# Patient Record
Sex: FEMALE | Race: WHITE | Employment: FULL TIME | URBAN - METROPOLITAN AREA
[De-identification: names, ages, dates, MRNs, and addresses within clinical notes are randomized per-mention and may not be internally consistent; named-entity substitution may affect disease eponyms.]

---

## 2019-05-23 ENCOUNTER — HOSPITAL ENCOUNTER (OUTPATIENT)
Dept: MAMMOGRAPHY | Age: 64
Discharge: HOME | End: 2019-05-23
Payer: COMMERCIAL

## 2019-05-23 DIAGNOSIS — Z12.31 VISIT FOR SCREENING MAMMOGRAM: ICD-10-CM

## 2019-05-23 PROCEDURE — 77067 SCR MAMMO BI INCL CAD: CPT

## 2019-05-30 ENCOUNTER — HOSPITAL ENCOUNTER (OUTPATIENT)
Dept: MAMMOGRAPHY | Age: 64
Discharge: HOME | End: 2019-05-30
Payer: COMMERCIAL

## 2019-05-30 DIAGNOSIS — R92.8 ABNORMAL SCREENING MAMMOGRAM: ICD-10-CM

## 2019-05-30 PROCEDURE — 76642 ULTRASOUND BREAST LIMITED: CPT

## 2019-05-30 PROCEDURE — 77065 DX MAMMO INCL CAD UNI: CPT

## 2020-02-19 ENCOUNTER — APPOINTMENT (RX ONLY)
Dept: URBAN - METROPOLITAN AREA CLINIC 349 | Facility: CLINIC | Age: 65
Setting detail: DERMATOLOGY
End: 2020-02-19

## 2020-02-19 DIAGNOSIS — L30.9 DERMATITIS, UNSPECIFIED: ICD-10-CM

## 2020-02-19 DIAGNOSIS — B35.1 TINEA UNGUIUM: ICD-10-CM

## 2020-02-19 PROCEDURE — ? TREATMENT REGIMEN

## 2020-02-19 PROCEDURE — 87220 TISSUE EXAM FOR FUNGI: CPT

## 2020-02-19 PROCEDURE — 99202 OFFICE O/P NEW SF 15 MIN: CPT

## 2020-02-19 PROCEDURE — ? COUNSELING

## 2020-02-19 PROCEDURE — ? PRESCRIPTION

## 2020-02-19 PROCEDURE — ? KOH PREP

## 2020-02-19 PROCEDURE — ? OTHER

## 2020-02-19 RX ORDER — FLUTICASONE PROPIONATE 0.05 MG/G
OINTMENT TOPICAL
Qty: 1 | Refills: 3 | Status: ERX | COMMUNITY
Start: 2020-02-19

## 2020-02-19 RX ADMIN — FLUTICASONE PROPIONATE: 0.05 OINTMENT TOPICAL at 00:00

## 2020-02-19 ASSESSMENT — LOCATION SIMPLE DESCRIPTION DERM
LOCATION SIMPLE: RIGHT GREAT TOE
LOCATION SIMPLE: LEFT GREAT TOE
LOCATION SIMPLE: LEFT HEEL
LOCATION SIMPLE: RIGHT HEEL
LOCATION SIMPLE: LEFT 3RD TOE
LOCATION SIMPLE: LEFT 2ND TOE

## 2020-02-19 ASSESSMENT — LOCATION DETAILED DESCRIPTION DERM
LOCATION DETAILED: RIGHT DORSAL GREAT TOE
LOCATION DETAILED: LEFT HEEL
LOCATION DETAILED: LEFT 3RD TOENAIL
LOCATION DETAILED: LEFT 2ND TOENAIL
LOCATION DETAILED: RIGHT HEEL
LOCATION DETAILED: LEFT DORSAL GREAT TOE

## 2020-02-19 ASSESSMENT — LOCATION ZONE DERM
LOCATION ZONE: TOE
LOCATION ZONE: TOENAIL
LOCATION ZONE: FEET

## 2020-02-19 NOTE — HPI: RASH
How Severe Is Your Rash?: mild
Is This A New Presentation, Or A Follow-Up?: Rash
Additional History: Patient stated that the flaking and peeling started last Summer. Patient that it was spread all around her heel and now has a patch on her left foot. Patient stated that her PCP had prescribed her an oral steroid for about eight days. Patient also mentioned that she has used a Hydrocortisone cream and it hasn’t seemed to help.

## 2020-02-19 NOTE — PROCEDURE: OTHER
Other (Free Text): Patient stated that the flaking and peeling started last Summer. Patient that it was spread all around her heel and now has a patch on her left foot. Patient stated that her PCP had prescribed her an oral steroid for about eight days. Patient also mentioned that she has used a Hydrocortisone cream and it hasn’t seemed to help. \\n\\nKOH was performed since patient does appear to have a toenail fungus present. PROSPER was negative. \\n\\nDiscussed using a steroid cream for a couple of weeks and then bringing back in three weeks and do another KOH. Stated that if it is a fungus, then the KOH would be positive. If it is psoriasis, then the steroid should clear it up. Dr. Carter approved this plan. \\n\\nRash on feet looks more psoriasis. Patient denies any joint pain.
Detail Level: Zone
Note Text (......Xxx Chief Complaint.): This diagnosis correlates with the

## 2020-03-18 ENCOUNTER — APPOINTMENT (RX ONLY)
Dept: URBAN - METROPOLITAN AREA CLINIC 349 | Facility: CLINIC | Age: 65
Setting detail: DERMATOLOGY
End: 2020-03-18

## 2020-03-18 DIAGNOSIS — B35.1 TINEA UNGUIUM: ICD-10-CM

## 2020-03-18 DIAGNOSIS — L30.9 DERMATITIS, UNSPECIFIED: ICD-10-CM | Status: INADEQUATELY CONTROLLED

## 2020-03-18 PROCEDURE — ? OTHER

## 2020-03-18 PROCEDURE — 87220 TISSUE EXAM FOR FUNGI: CPT | Mod: 91

## 2020-03-18 PROCEDURE — ? KOH PREP

## 2020-03-18 PROCEDURE — ? TREATMENT REGIMEN

## 2020-03-18 PROCEDURE — 99213 OFFICE O/P EST LOW 20 MIN: CPT

## 2020-03-18 PROCEDURE — ? COUNSELING

## 2020-03-18 ASSESSMENT — LOCATION DETAILED DESCRIPTION DERM
LOCATION DETAILED: LEFT HEEL
LOCATION DETAILED: RIGHT HEEL
LOCATION DETAILED: RIGHT DORSAL GREAT TOE
LOCATION DETAILED: LEFT 3RD TOENAIL
LOCATION DETAILED: LEFT DORSAL GREAT TOE
LOCATION DETAILED: RIGHT ACHILLES SKIN
LOCATION DETAILED: LEFT 2ND TOENAIL

## 2020-03-18 ASSESSMENT — LOCATION SIMPLE DESCRIPTION DERM
LOCATION SIMPLE: LEFT 2ND TOE
LOCATION SIMPLE: LEFT HEEL
LOCATION SIMPLE: LEFT 3RD TOE
LOCATION SIMPLE: RIGHT GREAT TOE
LOCATION SIMPLE: RIGHT ACHILLES SKIN
LOCATION SIMPLE: RIGHT HEEL
LOCATION SIMPLE: LEFT GREAT TOE

## 2020-03-18 ASSESSMENT — LOCATION ZONE DERM
LOCATION ZONE: TOE
LOCATION ZONE: LEG
LOCATION ZONE: TOENAIL
LOCATION ZONE: FEET

## 2020-03-18 NOTE — PROCEDURE: OTHER
Other (Free Text): Patient stated she hasn’t seen any improvement with the topical steroid. Rash appears to have gotten a lot larger and seems to be spreading. \\n\\nAnother KOH was performed today in office. Results came back negative. \\n\\nMentioned that we could do a biopsy to find out for sure what rash is. Stated that if patient choose that route, we will bring her back in two weeks after stopping the topical steroid. Patient would like to do the biopsy.
Detail Level: Zone
Note Text (......Xxx Chief Complaint.): This diagnosis correlates with the

## 2020-03-18 NOTE — PROCEDURE: KOH PREP
Showing: no hyphae
Showing: fungal hyphal elements: negative
Koh Procedure Text (Tissue Harvesting Technique): A 15-blade scalpel was used to scrape the skin. The skin scrapings were placed on a glass slide, covered with a coverslip and a KOH solution was applied.
Detail Level: Zone
Koh Intro Text (From The.....): A KOH prep was ordered and evaluated from the

## 2020-04-09 ENCOUNTER — APPOINTMENT (RX ONLY)
Dept: URBAN - METROPOLITAN AREA CLINIC 349 | Facility: CLINIC | Age: 65
Setting detail: DERMATOLOGY
End: 2020-04-09

## 2020-04-09 DIAGNOSIS — L30.9 DERMATITIS, UNSPECIFIED: ICD-10-CM | Status: WORSENING

## 2020-04-09 PROCEDURE — ? OTHER

## 2020-04-09 PROCEDURE — 11104 PUNCH BX SKIN SINGLE LESION: CPT

## 2020-04-09 PROCEDURE — ? COUNSELING

## 2020-04-09 PROCEDURE — ? BIOPSY BY PUNCH METHOD

## 2020-04-09 PROCEDURE — A4550 SURGICAL TRAYS: HCPCS

## 2020-04-09 ASSESSMENT — LOCATION SIMPLE DESCRIPTION DERM
LOCATION SIMPLE: LEFT HEEL
LOCATION SIMPLE: RIGHT HEEL

## 2020-04-09 ASSESSMENT — LOCATION DETAILED DESCRIPTION DERM
LOCATION DETAILED: RIGHT HEEL
LOCATION DETAILED: LEFT HEEL

## 2020-04-09 ASSESSMENT — LOCATION ZONE DERM: LOCATION ZONE: FEET

## 2020-04-09 NOTE — PROCEDURE: OTHER
Note Text (......Xxx Chief Complaint.): This diagnosis correlates with the
Detail Level: Zone
Other (Free Text): Patient stated she hasn’t seen any improvement with the topical steroid. Rash appears to have gotten a lot larger and seems to be spreading.

## 2020-04-09 NOTE — PROCEDURE: BIOPSY BY PUNCH METHOD
Patient Will Remove Sutures At Home?: No
Wound Care: Vaseline
Consent: Written consent was obtained and risks were reviewed including but not limited to scarring, infection, bleeding, scabbing, incomplete removal, nerve damage and allergy to anesthesia.
Biopsy Type: H and E
Home Suture Removal Text: Patient was provided a home suture removal kit and will remove their sutures at home.  If they have any questions or difficulties they will call the office.
X Depth Of Punch In Cm (Optional): 0
Punch Size In Mm: 3
Detail Level: Detailed
Bill For Surgical Tray: yes
Information: Selecting Yes will display possible errors in your note based on the variables you have selected. This validation is only offered as a suggestion for you. PLEASE NOTE THAT THE VALIDATION TEXT WILL BE REMOVED WHEN YOU FINALIZE YOUR NOTE. IF YOU WANT TO FAX A PRELIMINARY NOTE YOU WILL NEED TO TOGGLE THIS TO 'NO' IF YOU DO NOT WANT IT IN YOUR FAXED NOTE.
Hemostasis: None
Post-Care Instructions: I reviewed with the patient in detail post-care instructions. Patient is to keep the biopsy site dry overnight, and then apply bacitracin twice daily until healed. Patient may apply hydrogen peroxide soaks to remove any crusting.
Suture Removal: 12 days
Anesthesia Volume In Cc (Will Not Render If 0): 0.7
Billing Type: Third-Party Bill
Accession #: path consultant
Notification Instructions: Patient will be notified of biopsy results. However, patient instructed to call the office if not contacted within 2 weeks.
Epidermal Sutures: 4-0 Nylon
Dressing: pressure dressing
Anesthesia Type: 2% lidocaine with epinephrine

## 2020-04-21 ENCOUNTER — APPOINTMENT (RX ONLY)
Dept: URBAN - METROPOLITAN AREA CLINIC 349 | Facility: CLINIC | Age: 65
Setting detail: DERMATOLOGY
End: 2020-04-21

## 2020-04-21 DIAGNOSIS — L40.0 PSORIASIS VULGARIS: ICD-10-CM

## 2020-04-21 DIAGNOSIS — Z48.02 ENCOUNTER FOR REMOVAL OF SUTURES: ICD-10-CM

## 2020-04-21 PROCEDURE — 99024 POSTOP FOLLOW-UP VISIT: CPT

## 2020-04-21 PROCEDURE — ? TREATMENT REGIMEN

## 2020-04-21 PROCEDURE — ? OTHER

## 2020-04-21 PROCEDURE — ? SUTURE REMOVAL (GLOBAL PERIOD)

## 2020-04-21 PROCEDURE — ? COUNSELING

## 2020-04-21 PROCEDURE — 99213 OFFICE O/P EST LOW 20 MIN: CPT

## 2020-04-21 ASSESSMENT — LOCATION SIMPLE DESCRIPTION DERM
LOCATION SIMPLE: LEFT HEEL
LOCATION SIMPLE: RIGHT HEEL

## 2020-04-21 ASSESSMENT — LOCATION DETAILED DESCRIPTION DERM
LOCATION DETAILED: RIGHT HEEL
LOCATION DETAILED: LEFT HEEL

## 2020-04-21 ASSESSMENT — LOCATION ZONE DERM: LOCATION ZONE: FEET

## 2020-04-21 NOTE — PROCEDURE: TREATMENT REGIMEN
Continue Regimen: Fluticasone ointment twice daily for two weeks
Action 3: Continue
Detail Level: Zone

## 2020-04-21 NOTE — PROCEDURE: SUTURE REMOVAL (GLOBAL PERIOD)
Add 69382 Cpt? (Important Note: In 2017 The Use Of 57928 Is Being Tracked By Cms To Determine Future Global Period Reimbursement For Global Periods): yes
Detail Level: Detailed

## 2020-04-21 NOTE — PROCEDURE: OTHER
Other (Free Text): Discussed different treatment options with patient and discussed pathology more in-depth today in office.\\n\\nMentioned that there is different topicals we could try or we could do a biologic. Mentioned that both are long term maintenance options. Patient is hesitant about doing a biologic at this time. \\n\\nPatient stated she gets mild joint pain at the back of her heel. Patient stated that it isn’t terrible. Discussed psoriatic arthritis and advised patient to inform us if joint pain worsens. \\n\\nPatient stated that she would like to transfer care over to her previous dermatologist because we are out of her network. Will give a printed copy of her pathology to take with her to her other appointment. Discussed incorporating ammonia lactate along with topical steroid. Patient declined Rx at this time because she is going to be seen by a different doctor.
Note Text (......Xxx Chief Complaint.): This diagnosis correlates with the
Detail Level: Zone

## 2020-10-01 ENCOUNTER — HOSPITAL ENCOUNTER (OUTPATIENT)
Dept: MAMMOGRAPHY | Age: 65
Discharge: HOME | End: 2020-10-01
Payer: COMMERCIAL

## 2020-10-01 DIAGNOSIS — Z12.31 VISIT FOR SCREENING MAMMOGRAM: ICD-10-CM

## 2020-10-01 PROCEDURE — 77067 SCR MAMMO BI INCL CAD: CPT

## 2021-11-03 ENCOUNTER — TRANSCRIBE ORDER (OUTPATIENT)
Dept: SCHEDULING | Age: 66
End: 2021-11-03

## 2021-11-03 DIAGNOSIS — Z12.31 SCREENING MAMMOGRAM FOR HIGH-RISK PATIENT: Primary | ICD-10-CM

## 2022-01-12 ENCOUNTER — HOSPITAL ENCOUNTER (OUTPATIENT)
Dept: MAMMOGRAPHY | Age: 67
Discharge: HOME | End: 2022-01-12
Payer: MEDICARE

## 2022-01-12 DIAGNOSIS — Z12.31 SCREENING MAMMOGRAM FOR HIGH-RISK PATIENT: ICD-10-CM

## 2022-01-12 PROCEDURE — 77067 SCR MAMMO BI INCL CAD: CPT

## 2023-04-05 ENCOUNTER — HOSPITAL ENCOUNTER (OUTPATIENT)
Dept: MAMMOGRAPHY | Age: 68
Discharge: HOME | End: 2023-04-08
Payer: MEDICARE

## 2023-04-05 DIAGNOSIS — Z12.31 ENCOUNTER FOR SCREENING MAMMOGRAM FOR MALIGNANT NEOPLASM OF BREAST: ICD-10-CM

## 2023-04-05 PROCEDURE — 77067 SCR MAMMO BI INCL CAD: CPT

## 2024-06-15 ENCOUNTER — HOSPITAL ENCOUNTER (OUTPATIENT)
Dept: MAMMOGRAPHY | Age: 69
End: 2024-06-15
Payer: MEDICARE

## 2024-06-15 VITALS — WEIGHT: 160 LBS | BODY MASS INDEX: 25.11 KG/M2 | HEIGHT: 67 IN

## 2024-06-15 DIAGNOSIS — Z12.31 VISIT FOR SCREENING MAMMOGRAM: ICD-10-CM

## 2024-06-15 PROCEDURE — 77067 SCR MAMMO BI INCL CAD: CPT

## 2025-05-22 ENCOUNTER — TRANSCRIBE ORDERS (OUTPATIENT)
Dept: SCHEDULING | Age: 70
End: 2025-05-22

## 2025-05-22 DIAGNOSIS — Z12.31 VISIT FOR SCREENING MAMMOGRAM: Primary | ICD-10-CM

## 2025-06-25 ENCOUNTER — HOSPITAL ENCOUNTER (OUTPATIENT)
Dept: MAMMOGRAPHY | Age: 70
Discharge: HOME | End: 2025-06-28
Payer: MEDICARE

## 2025-06-25 VITALS — HEIGHT: 67 IN | BODY MASS INDEX: 25.11 KG/M2 | WEIGHT: 160 LBS

## 2025-06-25 DIAGNOSIS — Z12.31 VISIT FOR SCREENING MAMMOGRAM: ICD-10-CM

## 2025-06-25 PROCEDURE — 77063 BREAST TOMOSYNTHESIS BI: CPT
